# Patient Record
Sex: FEMALE | Race: ASIAN | URBAN - METROPOLITAN AREA
[De-identification: names, ages, dates, MRNs, and addresses within clinical notes are randomized per-mention and may not be internally consistent; named-entity substitution may affect disease eponyms.]

---

## 2021-11-05 ENCOUNTER — LAB OUTSIDE AN ENCOUNTER (OUTPATIENT)
Dept: URBAN - METROPOLITAN AREA CLINIC 115 | Facility: CLINIC | Age: 31
End: 2021-11-05

## 2021-11-05 ENCOUNTER — OFFICE VISIT (OUTPATIENT)
Dept: URBAN - METROPOLITAN AREA CLINIC 115 | Facility: CLINIC | Age: 31
End: 2021-11-05
Payer: COMMERCIAL

## 2021-11-05 ENCOUNTER — WEB ENCOUNTER (OUTPATIENT)
Dept: URBAN - METROPOLITAN AREA CLINIC 115 | Facility: CLINIC | Age: 31
End: 2021-11-05

## 2021-11-05 DIAGNOSIS — R74.8 ABNORMAL LIVER ENZYMES: ICD-10-CM

## 2021-11-05 PROCEDURE — 99244 OFF/OP CNSLTJ NEW/EST MOD 40: CPT | Performed by: INTERNAL MEDICINE

## 2021-11-05 PROCEDURE — 99204 OFFICE O/P NEW MOD 45 MIN: CPT | Performed by: INTERNAL MEDICINE

## 2021-11-05 RX ORDER — LEVOTHYROXINE SODIUM 0.17 MG/1
1 TABLET IN THE MORNING ON AN EMPTY STOMACH TABLET ORAL ONCE A DAY
Status: ACTIVE | COMMUNITY

## 2021-11-05 NOTE — HPI-TODAY'S VISIT:
Patient is a 31-year-old Citizen of [place] origin female who is 12 weeks pregnant came to the office for the evaluation of abnormal liver enzymes.  Patient stated her original labs showed AST within normal limits and ALT of 43 and TSH 7.5.  That her medications have been adjusted and repeat follow-up labs shows AST of 47 ALT of 119.  Patient has hepatitis A- HIV negative and patient denies any prior history of liver disease dating patient denies any family history of liver disease no family history of pregnancy related liver disease.  Patient denies any right upper quadrant abdominal pain nausea vomiting denies any chest pain shortness of breath.  Patient stated she had some hyperemesis in the during the first 3 weeks of her pregnancy however symptoms are improving.  Denies any chest pain and shortness of breath.  Patient denies any high blood pressure denies any other underlying chronic liver disease.

## 2021-11-08 ENCOUNTER — LAB OUTSIDE AN ENCOUNTER (OUTPATIENT)
Dept: URBAN - METROPOLITAN AREA CLINIC 82 | Facility: CLINIC | Age: 31
End: 2021-11-08

## 2021-11-08 LAB
A/G RATIO: 1.4
ACTIN (SMOOTH MUSCLE) ANTIBODY: 4
ALBUMIN: 4
ALKALINE PHOSPHATASE: 121
ALT (SGPT): 136
ANA DIRECT: NEGATIVE
AST (SGOT): 58
BILE ACIDS: 3.9
BILIRUBIN, TOTAL: 0.3
BUN/CREATININE RATIO: 14
BUN: 8
CALCIUM: 9.6
CARBON DIOXIDE, TOTAL: 21
CHLORIDE: 104
CREATININE: 0.57
EGFR IF AFRICN AM: 145
EGFR IF NONAFRICN AM: 126
GLOBULIN, TOTAL: 2.8
GLUCOSE: 83
HEP A AB, TOTAL: POSITIVE
HEP B CORE AB, TOT: NEGATIVE
HEP B SURFACE AB, QUAL: REACTIVE
MITOCHONDRIAL (M2) ANTIBODY: <20
POTASSIUM: 4.5
PROTEIN, TOTAL: 6.8
SODIUM: 137

## 2021-12-03 ENCOUNTER — LAB OUTSIDE AN ENCOUNTER (OUTPATIENT)
Dept: URBAN - METROPOLITAN AREA CLINIC 115 | Facility: CLINIC | Age: 31
End: 2021-12-03

## 2022-04-14 ENCOUNTER — TELEPHONE ENCOUNTER (OUTPATIENT)
Dept: URBAN - METROPOLITAN AREA CLINIC 82 | Facility: CLINIC | Age: 32
End: 2022-04-14

## 2022-04-14 ENCOUNTER — DASHBOARD ENCOUNTERS (OUTPATIENT)
Age: 32
End: 2022-04-14

## 2022-04-14 ENCOUNTER — OFFICE VISIT (OUTPATIENT)
Dept: URBAN - METROPOLITAN AREA TELEHEALTH 2 | Facility: TELEHEALTH | Age: 32
End: 2022-04-14
Payer: COMMERCIAL

## 2022-04-14 DIAGNOSIS — R74.8 ABNORMAL LIVER ENZYMES: ICD-10-CM

## 2022-04-14 DIAGNOSIS — D64.89 OTHER SPECIFIED ANEMIAS: ICD-10-CM

## 2022-04-14 PROBLEM — 271737000: Status: ACTIVE | Noted: 2022-04-14

## 2022-04-14 PROCEDURE — 99214 OFFICE O/P EST MOD 30 MIN: CPT | Performed by: INTERNAL MEDICINE

## 2022-04-14 RX ORDER — LEVOTHYROXINE SODIUM 0.17 MG/1
1 TABLET IN THE MORNING ON AN EMPTY STOMACH TABLET ORAL ONCE A DAY
COMMUNITY

## 2022-04-14 NOTE — HPI-TODAY'S VISIT:
was seen for the follow-up.  She is currently in her third trimester gynecologist recommended to follow-up with the GI one more time for her abnormal liver enzymes.  Last seen November work-up showed normal bile salts and estimated elevated AST and ALT most recent AST and ALT was told to be not significant however these are patient reports do not have those labs available.  She her bowel sounds were within normal limits.  Negative for hep B and patient is immune to hepatitis A.  Autoimmune work-up was negative.

## 2022-04-15 ENCOUNTER — TELEPHONE ENCOUNTER (OUTPATIENT)
Dept: URBAN - METROPOLITAN AREA CLINIC 92 | Facility: CLINIC | Age: 32
End: 2022-04-15

## 2022-04-23 LAB
A/G RATIO: 1.1
ALBUMIN: 3.4
ALKALINE PHOSPHATASE: 177
ALT (SGPT): 17
AST (SGOT): 14
BASO (ABSOLUTE): 0.1
BASOS: 0
BILIRUBIN, TOTAL: 0.2
BUN/CREATININE RATIO: 9
BUN: 4
CALCIUM: 9.2
CARBON DIOXIDE, TOTAL: 17
CHENODEOXYCHOLIC ACIDS: 0.6
CHLORIDE: 104
CHOLIC ACIDS: 1
CREATININE: 0.47
DEOXYCHOLIC ACIDS: 0.4
EGFR: 132
EOS (ABSOLUTE): 0.2
EOS: 1
GLOBULIN, TOTAL: 3
GLUCOSE: 99
HEMATOCRIT: 35.4
HEMATOLOGY COMMENTS:: (no result)
HEMOGLOBIN: 11.3
IMMATURE CELLS: (no result)
IMMATURE GRANS (ABS): 0.2
IMMATURE GRANULOCYTES: 1
INR: 0.9
LYMPHS (ABSOLUTE): 2.2
LYMPHS: 18
MCH: 28.2
MCHC: 31.9
MCV: 88
MONOCYTES(ABSOLUTE): 0.6
MONOCYTES: 5
NEUTROPHILS (ABSOLUTE): 9.3
NEUTROPHILS: 75
NRBC: (no result)
PLATELETS: 289
POTASSIUM: 4.6
PROTEIN, TOTAL: 6.4
PROTHROMBIN TIME: 9.9
RBC: 4.01
RDW: 14.3
SODIUM: 138
TOTAL BILE ACIDS: 2
URSODEOXYCHOLIC ACIDS: <0.1
WBC: 12.5